# Patient Record
Sex: FEMALE | Race: ASIAN | NOT HISPANIC OR LATINO | URBAN - METROPOLITAN AREA
[De-identification: names, ages, dates, MRNs, and addresses within clinical notes are randomized per-mention and may not be internally consistent; named-entity substitution may affect disease eponyms.]

---

## 2023-05-24 ENCOUNTER — EMERGENCY (EMERGENCY)
Facility: HOSPITAL | Age: 73
LOS: 0 days | Discharge: HOME | End: 2023-05-25
Attending: EMERGENCY MEDICINE
Payer: MEDICARE

## 2023-05-24 VITALS
TEMPERATURE: 99 F | SYSTOLIC BLOOD PRESSURE: 169 MMHG | OXYGEN SATURATION: 98 % | HEART RATE: 75 BPM | WEIGHT: 123.9 LBS | RESPIRATION RATE: 18 BRPM | HEIGHT: 60.63 IN | DIASTOLIC BLOOD PRESSURE: 73 MMHG

## 2023-05-24 VITALS
RESPIRATION RATE: 18 BRPM | HEART RATE: 74 BPM | OXYGEN SATURATION: 98 % | TEMPERATURE: 99 F | DIASTOLIC BLOOD PRESSURE: 67 MMHG | SYSTOLIC BLOOD PRESSURE: 146 MMHG

## 2023-05-24 DIAGNOSIS — Z86.19 PERSONAL HISTORY OF OTHER INFECTIOUS AND PARASITIC DISEASES: ICD-10-CM

## 2023-05-24 DIAGNOSIS — E11.9 TYPE 2 DIABETES MELLITUS WITHOUT COMPLICATIONS: ICD-10-CM

## 2023-05-24 DIAGNOSIS — Z87.19 PERSONAL HISTORY OF OTHER DISEASES OF THE DIGESTIVE SYSTEM: ICD-10-CM

## 2023-05-24 DIAGNOSIS — K21.9 GASTRO-ESOPHAGEAL REFLUX DISEASE WITHOUT ESOPHAGITIS: ICD-10-CM

## 2023-05-24 DIAGNOSIS — Z85.41 PERSONAL HISTORY OF MALIGNANT NEOPLASM OF CERVIX UTERI: ICD-10-CM

## 2023-05-24 DIAGNOSIS — Z90.710 ACQUIRED ABSENCE OF BOTH CERVIX AND UTERUS: ICD-10-CM

## 2023-05-24 DIAGNOSIS — K80.20 CALCULUS OF GALLBLADDER WITHOUT CHOLECYSTITIS WITHOUT OBSTRUCTION: ICD-10-CM

## 2023-05-24 DIAGNOSIS — I10 ESSENTIAL (PRIMARY) HYPERTENSION: ICD-10-CM

## 2023-05-24 DIAGNOSIS — R10.30 LOWER ABDOMINAL PAIN, UNSPECIFIED: ICD-10-CM

## 2023-05-24 LAB
ALBUMIN SERPL ELPH-MCNC: 4.5 G/DL — SIGNIFICANT CHANGE UP (ref 3.5–5.2)
ALP SERPL-CCNC: 58 U/L — SIGNIFICANT CHANGE UP (ref 30–115)
ALT FLD-CCNC: 28 U/L — SIGNIFICANT CHANGE UP (ref 0–41)
ANION GAP SERPL CALC-SCNC: 11 MMOL/L — SIGNIFICANT CHANGE UP (ref 7–14)
APPEARANCE UR: CLEAR — SIGNIFICANT CHANGE UP
APTT BLD: 27.7 SEC — SIGNIFICANT CHANGE UP (ref 27–39.2)
AST SERPL-CCNC: 25 U/L — SIGNIFICANT CHANGE UP (ref 0–41)
BACTERIA # UR AUTO: NEGATIVE — SIGNIFICANT CHANGE UP
BASOPHILS # BLD AUTO: 0.03 K/UL — SIGNIFICANT CHANGE UP (ref 0–0.2)
BASOPHILS NFR BLD AUTO: 0.7 % — SIGNIFICANT CHANGE UP (ref 0–1)
BILIRUB SERPL-MCNC: 0.4 MG/DL — SIGNIFICANT CHANGE UP (ref 0.2–1.2)
BILIRUB UR-MCNC: NEGATIVE — SIGNIFICANT CHANGE UP
BLD GP AB SCN SERPL QL: SIGNIFICANT CHANGE UP
BUN SERPL-MCNC: 16 MG/DL — SIGNIFICANT CHANGE UP (ref 10–20)
CALCIUM SERPL-MCNC: 9.5 MG/DL — SIGNIFICANT CHANGE UP (ref 8.4–10.4)
CHLORIDE SERPL-SCNC: 101 MMOL/L — SIGNIFICANT CHANGE UP (ref 98–110)
CO2 SERPL-SCNC: 25 MMOL/L — SIGNIFICANT CHANGE UP (ref 17–32)
COLOR SPEC: COLORLESS — SIGNIFICANT CHANGE UP
CREAT SERPL-MCNC: 0.8 MG/DL — SIGNIFICANT CHANGE UP (ref 0.7–1.5)
DIFF PNL FLD: ABNORMAL
EGFR: 79 ML/MIN/1.73M2 — SIGNIFICANT CHANGE UP
EOSINOPHIL # BLD AUTO: 0.11 K/UL — SIGNIFICANT CHANGE UP (ref 0–0.7)
EOSINOPHIL NFR BLD AUTO: 2.4 % — SIGNIFICANT CHANGE UP (ref 0–8)
EPI CELLS # UR: 1 /HPF — SIGNIFICANT CHANGE UP (ref 0–5)
GLUCOSE SERPL-MCNC: 272 MG/DL — HIGH (ref 70–99)
GLUCOSE UR QL: ABNORMAL
HCT VFR BLD CALC: 41.1 % — SIGNIFICANT CHANGE UP (ref 37–47)
HGB BLD-MCNC: 13.4 G/DL — SIGNIFICANT CHANGE UP (ref 12–16)
HYALINE CASTS # UR AUTO: 0 /LPF — SIGNIFICANT CHANGE UP (ref 0–7)
IMM GRANULOCYTES NFR BLD AUTO: 0.2 % — SIGNIFICANT CHANGE UP (ref 0.1–0.3)
INR BLD: 0.91 RATIO — SIGNIFICANT CHANGE UP (ref 0.65–1.3)
KETONES UR-MCNC: NEGATIVE — SIGNIFICANT CHANGE UP
LACTATE SERPL-SCNC: 1.8 MMOL/L — SIGNIFICANT CHANGE UP (ref 0.7–2)
LEUKOCYTE ESTERASE UR-ACNC: NEGATIVE — SIGNIFICANT CHANGE UP
LIDOCAIN IGE QN: 31 U/L — SIGNIFICANT CHANGE UP (ref 7–60)
LYMPHOCYTES # BLD AUTO: 1.23 K/UL — SIGNIFICANT CHANGE UP (ref 1.2–3.4)
LYMPHOCYTES # BLD AUTO: 27 % — SIGNIFICANT CHANGE UP (ref 20.5–51.1)
MCHC RBC-ENTMCNC: 29.8 PG — SIGNIFICANT CHANGE UP (ref 27–31)
MCHC RBC-ENTMCNC: 32.6 G/DL — SIGNIFICANT CHANGE UP (ref 32–37)
MCV RBC AUTO: 91.3 FL — SIGNIFICANT CHANGE UP (ref 81–99)
MONOCYTES # BLD AUTO: 0.28 K/UL — SIGNIFICANT CHANGE UP (ref 0.1–0.6)
MONOCYTES NFR BLD AUTO: 6.2 % — SIGNIFICANT CHANGE UP (ref 1.7–9.3)
NEUTROPHILS # BLD AUTO: 2.89 K/UL — SIGNIFICANT CHANGE UP (ref 1.4–6.5)
NEUTROPHILS NFR BLD AUTO: 63.5 % — SIGNIFICANT CHANGE UP (ref 42.2–75.2)
NITRITE UR-MCNC: NEGATIVE — SIGNIFICANT CHANGE UP
NRBC # BLD: 0 /100 WBCS — SIGNIFICANT CHANGE UP (ref 0–0)
PH UR: 6 — SIGNIFICANT CHANGE UP (ref 5–8)
PLATELET # BLD AUTO: 184 K/UL — SIGNIFICANT CHANGE UP (ref 130–400)
PMV BLD: 10.8 FL — HIGH (ref 7.4–10.4)
POTASSIUM SERPL-MCNC: 3.9 MMOL/L — SIGNIFICANT CHANGE UP (ref 3.5–5)
POTASSIUM SERPL-SCNC: 3.9 MMOL/L — SIGNIFICANT CHANGE UP (ref 3.5–5)
PROT SERPL-MCNC: 7.3 G/DL — SIGNIFICANT CHANGE UP (ref 6–8)
PROT UR-MCNC: NEGATIVE — SIGNIFICANT CHANGE UP
PROTHROM AB SERPL-ACNC: 10.3 SEC — SIGNIFICANT CHANGE UP (ref 9.95–12.87)
RBC # BLD: 4.5 M/UL — SIGNIFICANT CHANGE UP (ref 4.2–5.4)
RBC # FLD: 12.7 % — SIGNIFICANT CHANGE UP (ref 11.5–14.5)
RBC CASTS # UR COMP ASSIST: 1 /HPF — SIGNIFICANT CHANGE UP (ref 0–4)
SODIUM SERPL-SCNC: 137 MMOL/L — SIGNIFICANT CHANGE UP (ref 135–146)
SP GR SPEC: 1.01 — LOW (ref 1.01–1.03)
UROBILINOGEN FLD QL: SIGNIFICANT CHANGE UP
WBC # BLD: 4.55 K/UL — LOW (ref 4.8–10.8)
WBC # FLD AUTO: 4.55 K/UL — LOW (ref 4.8–10.8)
WBC UR QL: 1 /HPF — SIGNIFICANT CHANGE UP (ref 0–5)

## 2023-05-24 PROCEDURE — 76705 ECHO EXAM OF ABDOMEN: CPT

## 2023-05-24 PROCEDURE — 85730 THROMBOPLASTIN TIME PARTIAL: CPT

## 2023-05-24 PROCEDURE — 99284 EMERGENCY DEPT VISIT MOD MDM: CPT | Mod: FS

## 2023-05-24 PROCEDURE — 74177 CT ABD & PELVIS W/CONTRAST: CPT | Mod: 26,MA

## 2023-05-24 PROCEDURE — 99284 EMERGENCY DEPT VISIT MOD MDM: CPT | Mod: 25

## 2023-05-24 PROCEDURE — 83690 ASSAY OF LIPASE: CPT

## 2023-05-24 PROCEDURE — 86850 RBC ANTIBODY SCREEN: CPT

## 2023-05-24 PROCEDURE — 86901 BLOOD TYPING SEROLOGIC RH(D): CPT

## 2023-05-24 PROCEDURE — 86900 BLOOD TYPING SEROLOGIC ABO: CPT

## 2023-05-24 PROCEDURE — 80053 COMPREHEN METABOLIC PANEL: CPT

## 2023-05-24 PROCEDURE — 87086 URINE CULTURE/COLONY COUNT: CPT

## 2023-05-24 PROCEDURE — 76705 ECHO EXAM OF ABDOMEN: CPT | Mod: 26

## 2023-05-24 PROCEDURE — 85610 PROTHROMBIN TIME: CPT

## 2023-05-24 PROCEDURE — 83605 ASSAY OF LACTIC ACID: CPT

## 2023-05-24 PROCEDURE — 36415 COLL VENOUS BLD VENIPUNCTURE: CPT

## 2023-05-24 PROCEDURE — 85025 COMPLETE CBC W/AUTO DIFF WBC: CPT

## 2023-05-24 PROCEDURE — 74177 CT ABD & PELVIS W/CONTRAST: CPT | Mod: MA

## 2023-05-24 PROCEDURE — 81001 URINALYSIS AUTO W/SCOPE: CPT

## 2023-05-24 NOTE — ED PROVIDER NOTE - OBJECTIVE STATEMENT
72 yo F with pmhx of Hep B, DM, HTN, cervical cancer s/p hysterectomy presenting for evaluation of lower abdominal pain x 2 days. Symptoms are moderate. Pain is worse with palpation. No change in appetite. No cp, sob, fever, chills, nausea, vomiting, diarrhea, back pain, urinary symptoms, headache, dizziness, paresthesias, or weakness.

## 2023-05-24 NOTE — CONSULT NOTE ADULT - ASSESSMENT
ASSESSMENT:  73F with pmh of cervical cancer s/p hysterectomy and radiation therapy in 2014, HTN, DM2, cystic kidney disease, hep B, GERD presents to the ED with 1 day of b/l lower quadrant abdominal pain. Pain started last night after eating dinner, and is worse after eating per patient. Describes pain radiating to her lower back. Denies fevers/chills, nausea/vomiting, diarrhea/constipation, dysuria, vaginal bleeding/discharge, dark/bloody/tarry stools, better/worse with orthostatics, sob, chest pain, sick contacts, eating anything out of the usual. Follows annually with her gynecologist, last colonoscopy 1 yr ago and normal. RUQ US with stones which patient has known about for 20 years, normal CBD, negative moreira's, CTAP with no acute pathology. On exam, minimal tenderness RLQ. WBC 4.5, lfts wnl, lipase 31, lactate 1.8.    PLAN:  -no acute surgical intervention  -no signs of biliary colic/cholecystitis  -stool softener daily  -f/u with gynecologist OP if pain persists  -Return if fevers, inability to pass gas/bm, nausea/vomiting, severe abdominal pain  -check UA    Lines/Tubes: PIV, Midline, Central Line, A-Line, Chest tubes, Fernandez/ADRIAN drains, Verduzco Catheter.    Above plan discussed with Attending Surgeon Dr. Leo, patient, patient family, and Primary team  05-24-23 @ 23:55    CONSULT SPECTRA: [id number]

## 2023-05-24 NOTE — ED PROVIDER NOTE - NSFOLLOWUPINSTRUCTIONS_ED_ALL_ED_FT
Cholelithiasis    Cholelithiasis is a form of gallbladder disease in which gallstones form in the gallbladder. The gallbladder is an organ that stores bile. Bile is made in the liver, and it helps to digest fats. Gallstones begin as small crystals and slowly grow into stones. They may cause no symptoms until the gallbladder tightens (contracts) and a gallstone is blocking the duct (gallbladder attack), which can cause pain. Cholelithiasis is also referred to as gallstones.  There are two main types of gallstones:  Cholesterol stones. These are made of hardened cholesterol and are usually yellow-green in color. They are the most common type of gallstone. Cholesterol is a white, waxy, fat-like substance that is made in the liver.Pigment stones. These are dark in color and are made of a red-yellow substance that forms when hemoglobin from red blood cells breaks down (bilirubin).What are the causes?  This condition may be caused by an imbalance in the substances that bile is made of. This can happen if the bile:  Has too much bilirubin.Has too much cholesterol.Does not have enough bile salts. These salts help the body absorb and digest fats.In some cases, this condition can also be caused by the gallbladder not emptying completely or often enough.  What increases the risk?  The following factors may make you more likely to develop this condition:  Being female.Having multiple pregnancies. Health care providers sometimes advise removing diseased gallbladders before future pregnancies.Eating a diet that is heavy in fried foods, fat, and refined carbohydrates, like white bread and white rice.Being obese.Being older than age 40.Prolonged use of medicines that contain female hormones (estrogen).Having diabetes mellitus.Rapidly losing weight.Having a family history of gallstones.Being of  or [place] descent.Having an intestinal disease such as Crohn disease.Having metabolic syndrome.Having cirrhosis.Having severe types of anemia such as sickle cell anemia.What are the signs or symptoms?  In most cases, there are no symptoms. These are known as silent gallstones. If a gallstone blocks the bile ducts, it can cause a gallbladder attack. The main symptom of a gallbladder attack is sudden pain in the upper right abdomen. The pain usually comes at night or after eating a large meal. The pain can last for one or several hours and can spread to the right shoulder or chest.  If the bile duct is blocked for more than a few hours, it can cause infection or inflammation of the gallbladder, liver, or pancreas, which may cause:  Nausea.Vomiting.Abdominal pain that lasts for 5 hours or more.Fever or chills.Yellowing of the skin or the whites of the eyes (jaundice).Dark urine.Light-colored stools.How is this diagnosed?  This condition may be diagnosed based on:  A physical exam.Your medical history.An ultrasound of your gallbladder.CT scan.MRI.Blood tests to check for signs of infection or inflammation.A scan of your gallbladder and bile ducts (biliary system) using nonharmful radioactive material and special cameras that can see the radioactive material (cholescintigram). This test checks to see how your gallbladder contracts and whether bile ducts are blocked.Inserting a small tube with a camera on the end (endoscope) through your mouth to inspect bile ducts and check for blockages (endoscopic retrograde cholangiopancreatogram).How is this treated?  Treatment for gallstones depends on the severity of the condition. Silent gallstones do not need treatment. If the gallstones cause a gallbladder attack or other symptoms, treatment may be required. Options for treatment include:  Surgery to remove the gallbladder (cholecystectomy). This is the most common treatment.Medicines to dissolve gallstones. These are most effective at treating small gallstones. You may need to take medicines for up to 6–12 months.Shock wave treatment (extracorporeal biliary lithotripsy). In this treatment, an ultrasound machine sends shock waves to the gallbladder to break gallstones into smaller pieces. These pieces can then be passed into the intestines or be dissolved by medicine. This is rarely used.Removing gallstones through endoscopic retrograde cholangiopancreatogram. A small basket can be attached to the endoscope and used to capture and remove gallstones.Follow these instructions at home:  Take over-the-counter and prescription medicines only as told by your health care provider.Maintain a healthy weight and follow a healthy diet. This includes:  Reducing fatty foods, such as fried food.Reducing refined carbohydrates, like white bread and white rice.Increasing fiber. Aim for foods like almonds, fruit, and beans.Keep all follow-up visits as told by your health care provider. This is important.Contact a health care provider if:  You think you have had a gallbladder attack.You have been diagnosed with silent gallstones and you develop abdominal pain or indigestion.Get help right away if:  You have pain from a gallbladder attack that lasts for more than 2 hours.You have abdominal pain that lasts for more than 5 hours.You have a fever or chills.You have persistent nausea and vomiting.You develop jaundice.You have dark urine or light-colored stools.Summary  Cholelithiasis (also called gallstones) is a form of gallbladder disease in which gallstones form in the gallbladder.This condition is caused by an imbalance in the substances that make up bile. This can happen if the bile has too much cholesterol, too much bilirubin, or not enough bile salts.You are more likely to develop this condition if you are female, pregnant, using medicines with estrogen, obese, older than age 40, or have a family history of gallstones. You may also develop gallstones if you have diabetes, an intestinal disease, cirrhosis, or metabolic syndrome.Treatment for gallstones depends on the severity of the condition. Silent gallstones do not need treatment.If gallstones cause a gallbladder attack or other symptoms, treatment may be needed. The most common treatment is surgery to remove the gallbladder.

## 2023-05-24 NOTE — ED PROVIDER NOTE - PHYSICAL EXAMINATION
VITAL SIGNS: I have reviewed nursing notes and confirm.  CONSTITUTIONAL: Patient is in no acute distress.  SKIN: Skin exam is warm and dry, no acute rash.  HEAD: Normocephalic; atraumatic.  EYES: PERRL, EOM intact; conjunctiva and sclera clear.  ENT: No nasal discharge; airway clear.   NECK: Supple; non tender.  CARD: S1, S2 normal; no murmurs, gallops, or rubs. Regular rate and rhythm.  RESP: Clear to auscultation bilaterally. No wheezes, rales or rhonchi.  ABD: Normal bowel sounds; soft; non-distended; +lower abdominal tenderness. No rebound tenderness or guarding.   EXT: Normal ROM. No edema.  LYMPH: No acute cervical adenopathy.  NEURO: Alert, oriented. Grossly unremarkable. No focal deficits.  PSYCH: Cooperative, appropriate.

## 2023-05-24 NOTE — ED PROVIDER NOTE - PROGRESS NOTE DETAILS
Patient comfortable.  CT findings show possible cholecystitis, U/S non confirmatory.  Pending surgical consultation.  S/O at 11:15 Dr. Karimi.

## 2023-05-24 NOTE — ED PROVIDER NOTE - PATIENT PORTAL LINK FT
You can access the FollowMyHealth Patient Portal offered by [facility] by registering at the following website: [email or web address]. By joining [employer]’s FollowMyHealth portal, you will also be able to view your health information using other applications (apps) compatible with our system.

## 2023-05-24 NOTE — ED PROVIDER NOTE - CARE PROVIDER_API CALL
Izaiah Corado  Surgery  [address] [place] [id number]  Phone: [phone]  Fax: [phone]  Follow Up Time:

## 2023-05-24 NOTE — ED PROVIDER NOTE - NS ED ATTENDING STATEMENT MOD
This was a shared visit with the KHANH. I reviewed and verified the documentation and independently performed the documented:

## 2023-05-24 NOTE — ED PROVIDER NOTE - CLINICAL SUMMARY MEDICAL DECISION MAKING FREE TEXT BOX
ab pain  labs, imaging, supportive care  surg eval, cleared for dc for outpt follow up  return if worse

## 2023-05-24 NOTE — CONSULT NOTE ADULT - SUBJECTIVE AND OBJECTIVE BOX
GENERAL SURGERY CONSULT NOTE    Patient: ANGEL FERGUSON , 73y (54)Female   MRN: [id number]  Location: [facility] ED  Visit: 23 Emergency  Date: 23 @ 23:55    HPI:  73F with pmh of cervical cancer s/p hysterectomy and radiation therapy in , HTN, DM2, cystic kidney disease, hep B, GERD presents to the ED with 1 day of b/l lower quadrant abdominal pain. Pain started last night after eating dinner, and is worse after eating per patient. Describes pain radiating to her lower back. Denies fevers/chills, nausea/vomiting, diarrhea/constipation, dysuria, dark/bloody/tarry stools, better/worse with orthostatics, sob, chest pain, sick contacts, eating anything out of the usual. Follows annually with her gynecologist, last colonoscopy 1 yr ago and normal. RUQ US with stones which patient has known about for 20 years, normal CBD, negative moreira's, CTAP with no acute pathology. On exam, minimal tenderness RLQ. WBC 4.5, lfts wnl, lipase 31, lactate 1.8.    PAST MEDICAL & SURGICAL HISTORY:      Home Medications:        VITALS:  T(F): 98.9 (23 @ 15:43), Max: 99.2 (23 @ 13:30)  HR: 74 (23 @ 15:43) (74 - 75)  BP: 146/67 (23 @ 15:43) (146/67 - 169/73)  RR: 18 (23 @ 15:43) (18 - 18)  SpO2: 98% (23 @ 15:43) (98% - 98%)    PHYSICAL EXAM:  General: NAD, AAOx3, calm and cooperative  HEENT: NCAT, ALEX, EOMI, Trachea ML, Neck supple  Cardiac: RRR S1, S2, no Murmurs, rubs or gallops  Respiratory: CTAB, normal respiratory effort, breath sounds equal BL, no wheeze, rhonchi or crackles  Abdomen: Soft, non-distended, minimal RLQ tenderness    MEDICATIONS  (STANDING):    MEDICATIONS  (PRN):      LAB/STUDIES:                        13.4   4.55  )-----------( 184      ( 24 May 2023 16:02 )             41.1         137  |  101  |  16  ----------------------------<  272<H>  3.9   |  25  |  0.8    Ca    9.5      24 May 2023 16:02    TPro  7.3  /  Alb  4.5  /  TBili  0.4  /  DBili  x   /  AST  25  /  ALT  28  /  AlkPhos  58      PT/INR - ( 24 May 2023 16:02 )   PT: 10.30 sec;   INR: 0.91 ratio         PTT - ( 24 May 2023 16:02 )  PTT:27.7 sec  LIVER FUNCTIONS - ( 24 May 2023 16:02 )  Alb: 4.5 g/dL / Pro: 7.3 g/dL / ALK PHOS: 58 U/L / ALT: 28 U/L / AST: 25 U/L / GGT: x           Urinalysis Basic - ( 24 May 2023 16:15 )    Color: Colorless / Appearance: Clear / S.007 / pH: x  Gluc: x / Ketone: Negative  / Bili: Negative / Urobili: <2 mg/dL   Blood: x / Protein: Negative / Nitrite: Negative   Leuk Esterase: Negative / RBC: 1 /HPF / WBC 1 /HPF   Sq Epi: x / Non Sq Epi: x / Bacteria: Negative                  IMAGING:  < from: US Abdomen Upper Quadrant Right (23 @ 21:08) >  IMPRESSION:  Cholelithiasis without additional sonographic evidence of acute   cholecystitis.    --- End of Report ---    < end of copied text >  < from: CT Abdomen and Pelvis w/ IV Cont (23 @ 17:21) >    IMPRESSION:    Gallstones present with possible mild gallbladder wall thickening versus   pericholecystic fluid.    Further evaluation with right upper quadrant ultrasound is recommended..    Subcentimeter left lower lobe pulmonary nodule. Follow-up as per   Fleisch[employer] Society guidelines    --- End of Report ---    < end of copied text >      ACCESS DEVICES:  [ ] Peripheral IV

## 2023-05-25 LAB
CULTURE RESULTS: SIGNIFICANT CHANGE UP
SPECIMEN SOURCE: SIGNIFICANT CHANGE UP

## 2023-05-25 NOTE — ED ADULT NURSE REASSESSMENT NOTE - NS ED NURSE REASSESS COMMENT FT1
patient us discharged - discussed discharged instruction with her daughter as well. patient to follow-up with surgical doctor specified in discharged paper.

## 2023-05-25 NOTE — ED ADULT NURSE NOTE - NSFALLUNIVINTERV_ED_ALL_ED
Bed/Stretcher in lowest position, wheels locked, appropriate side rails in place/Call bell, personal items and telephone in reach/Instruct patient to call for assistance before getting out of bed/chair/stretcher/Non-slip footwear applied when patient is off stretcher/[place]eggs to call system/Physically safe environment - no spills, clutter or unnecessary equipment/Purposeful proactive rounding/Room/bathroom lighting operational, light cord in reach